# Patient Record
Sex: MALE | Race: WHITE | Employment: OTHER | URBAN - METROPOLITAN AREA
[De-identification: names, ages, dates, MRNs, and addresses within clinical notes are randomized per-mention and may not be internally consistent; named-entity substitution may affect disease eponyms.]

---

## 2017-12-19 PROBLEM — E78.5 HYPERLIPIDEMIA: Status: ACTIVE | Noted: 2017-12-19

## 2017-12-19 PROBLEM — Z91.09 ENVIRONMENTAL ALLERGIES: Status: ACTIVE | Noted: 2017-12-19

## 2023-06-01 PROBLEM — R73.03 PREDIABETES: Status: ACTIVE | Noted: 2021-02-10

## 2023-06-01 PROBLEM — I25.10 ATHEROSCLEROSIS OF NATIVE CORONARY ARTERY WITHOUT ANGINA PECTORIS: Status: ACTIVE | Noted: 2021-02-10

## 2023-07-07 PROBLEM — R06.00 DYSPNEA: Status: ACTIVE | Noted: 2023-07-07

## 2023-07-07 PROBLEM — R07.89 CHEST PRESSURE: Status: ACTIVE | Noted: 2023-07-07

## 2023-07-07 PROBLEM — J30.9 ALLERGIC RHINITIS: Status: ACTIVE | Noted: 2021-02-10

## 2023-07-19 PROBLEM — R60.9 PERIPHERAL EDEMA: Status: ACTIVE | Noted: 2023-07-19

## 2023-07-26 PROBLEM — Z09 HOSPITAL DISCHARGE FOLLOW-UP: Status: ACTIVE | Noted: 2023-07-26

## 2023-07-26 PROBLEM — J45.909 ASTHMA DUE TO SEASONAL ALLERGIES: Status: ACTIVE | Noted: 2023-07-26

## 2023-08-02 PROBLEM — I50.9 CHRONIC CONGESTIVE HEART FAILURE (HCC): Status: ACTIVE | Noted: 2023-08-02

## 2023-08-04 ENCOUNTER — TELEPHONE (OUTPATIENT)
Dept: CARDIOLOGY | Facility: MEDICAL CENTER | Age: 88
End: 2023-08-04

## 2023-08-04 NOTE — TELEPHONE ENCOUNTER
Phone Number Called: [phone]    Call outcome: Did not leave a detailed message. Requested patient to call back.    Message: Called to inform patient of JG recommendations. Unable to reach. Left VM for patient to call back when able.       ----- Message from VANESSA Valenzuela sent at 8/4/2023  3:08 PM PDT -----  Could you update patient and let him know that after discussing echocardiogram findings with Dr. Jolly, I will be ordering likely nuclear medicine PYP scan (which may be completed in [place]) and blood testing (can be completed in [place])  ----- Message -----  From: Eric Jolly M.D.  Sent: 8/2/2023   5:06 PM PDT  To: VANESSA Valenzuela    Abnormal septal wall motion is a visual finding with the bounce of the septum.  With effusion, there is a concern for constriction which will also give you abnormal septal motion.  I would recommend checking for amyloidosis if there is a concern for low voltage and heart failure symptoms.  ----- Message -----  From: VANESSA Valenzuela  Sent: 8/1/2023   4:14 PM PDT  To: Eric Jolly M.D.    What were you referring to with abnormal septal wall motion on echo? He looks like HF, but echo reads otherwise except for the Septal wall note. Low voltage on ekg  I am going to check other non-cardiac stuff, but just wanted to check in case I missed something in your interpretation.  TY!

## 2023-11-03 PROBLEM — I50.9 CHRONIC CONGESTIVE HEART FAILURE (HCC): Status: RESOLVED | Noted: 2023-08-02 | Resolved: 2023-11-03

## 2023-11-03 PROBLEM — I31.39 PERICARDIAL EFFUSION: Status: ACTIVE | Noted: 2023-11-03

## 2024-01-08 PROBLEM — E11.9 TYPE 2 DIABETES MELLITUS WITHOUT COMPLICATION, WITHOUT LONG-TERM CURRENT USE OF INSULIN (HCC): Status: ACTIVE | Noted: 2024-01-08

## 2024-01-25 PROBLEM — B34.8 RHINOVIRUS INFECTION: Status: ACTIVE | Noted: 2024-01-25

## 2024-01-25 PROBLEM — M79.89 BILATERAL HAND SWELLING: Status: ACTIVE | Noted: 2024-01-25

## 2024-01-25 PROBLEM — K70.31 ALCOHOLIC CIRRHOSIS OF LIVER WITH ASCITES (HCC): Status: ACTIVE | Noted: 2024-01-25

## 2024-01-25 PROBLEM — N18.30 CKD (CHRONIC KIDNEY DISEASE), STAGE III: Status: ACTIVE | Noted: 2024-01-25

## 2024-01-25 PROBLEM — R62.7 FAILURE TO THRIVE IN ADULT: Status: ACTIVE | Noted: 2024-01-25

## 2024-01-25 PROBLEM — I31.39 PERICARDIAL EFFUSION: Status: RESOLVED | Noted: 2023-11-03 | Resolved: 2024-01-25

## 2024-01-25 PROBLEM — J18.9 MULTIFOCAL PNEUMONIA: Status: ACTIVE | Noted: 2024-01-25

## 2024-02-08 PROBLEM — H35.30 MACULAR DEGENERATION: Status: ACTIVE | Noted: 2024-02-08

## 2024-03-13 PROBLEM — R60.0 BILATERAL EDEMA OF LOWER EXTREMITY: Status: ACTIVE | Noted: 2024-03-13

## 2024-03-13 PROBLEM — R23.8 OTHER SKIN CHANGES: Status: ACTIVE | Noted: 2024-03-13
